# Patient Record
Sex: MALE | Race: WHITE
[De-identification: names, ages, dates, MRNs, and addresses within clinical notes are randomized per-mention and may not be internally consistent; named-entity substitution may affect disease eponyms.]

---

## 2017-07-06 ENCOUNTER — HOSPITAL ENCOUNTER (OUTPATIENT)
Dept: HOSPITAL 17 - HSDC | Age: 67
Discharge: HOME | End: 2017-07-06
Attending: SURGERY
Payer: COMMERCIAL

## 2017-07-06 VITALS
TEMPERATURE: 98.2 F | HEART RATE: 86 BPM | DIASTOLIC BLOOD PRESSURE: 87 MMHG | OXYGEN SATURATION: 96 % | SYSTOLIC BLOOD PRESSURE: 126 MMHG | RESPIRATION RATE: 18 BRPM

## 2017-07-06 VITALS — WEIGHT: 177.25 LBS | BODY MASS INDEX: 24.01 KG/M2 | HEIGHT: 72 IN

## 2017-07-06 DIAGNOSIS — M79.89: ICD-10-CM

## 2017-07-06 DIAGNOSIS — I10: ICD-10-CM

## 2017-07-06 DIAGNOSIS — I73.9: Primary | ICD-10-CM

## 2017-07-06 DIAGNOSIS — Z01.818: ICD-10-CM

## 2017-07-06 LAB
ANION GAP SERPL CALC-SCNC: 7 MEQ/L (ref 5–15)
APTT BLD: 27.9 SEC (ref 24.3–30.1)
BASOPHILS # BLD AUTO: 0.1 TH/MM3 (ref 0–0.2)
BASOPHILS NFR BLD: 0.9 % (ref 0–2)
BUN SERPL-MCNC: 14 MG/DL (ref 7–18)
CHLORIDE SERPL-SCNC: 106 MEQ/L (ref 98–107)
EOSINOPHIL # BLD: 0.1 TH/MM3 (ref 0–0.4)
EOSINOPHIL NFR BLD: 0.9 % (ref 0–4)
ERYTHROCYTE [DISTWIDTH] IN BLOOD BY AUTOMATED COUNT: 14.4 % (ref 11.6–17.2)
GFR SERPLBLD BASED ON 1.73 SQ M-ARVRAT: 74 ML/MIN (ref 89–?)
HCO3 BLD-SCNC: 25.8 MEQ/L (ref 21–32)
HCT VFR BLD CALC: 51.9 % (ref 39–51)
HEMO FLAGS: (no result)
INR PPP: 1 RATIO
LYMPHOCYTES # BLD AUTO: 1.4 TH/MM3 (ref 1–4.8)
LYMPHOCYTES NFR BLD AUTO: 20.9 % (ref 9–44)
MCH RBC QN AUTO: 32.3 PG (ref 27–34)
MCHC RBC AUTO-ENTMCNC: 34 % (ref 32–36)
MCV RBC AUTO: 95 FL (ref 80–100)
MONOCYTES NFR BLD: 8.4 % (ref 0–8)
NEUTROPHILS # BLD AUTO: 4.8 TH/MM3 (ref 1.8–7.7)
NEUTROPHILS NFR BLD AUTO: 68.9 % (ref 16–70)
PLATELET # BLD: 270 TH/MM3 (ref 150–450)
POTASSIUM SERPL-SCNC: 3.9 MEQ/L (ref 3.5–5.1)
PROTHROMBIN TIME: 11.5 SEC (ref 9.8–11.6)
RBC # BLD AUTO: 5.46 MIL/MM3 (ref 4.5–5.9)
SODIUM SERPL-SCNC: 139 MEQ/L (ref 136–145)
WBC # BLD AUTO: 6.9 TH/MM3 (ref 4–11)

## 2017-07-06 PROCEDURE — C1769 GUIDE WIRE: HCPCS

## 2017-07-06 PROCEDURE — 85025 COMPLETE CBC W/AUTO DIFF WBC: CPT

## 2017-07-06 PROCEDURE — 85002 BLEEDING TIME TEST: CPT

## 2017-07-06 PROCEDURE — 80048 BASIC METABOLIC PNL TOTAL CA: CPT

## 2017-07-06 PROCEDURE — G0269 OCCLUSIVE DEVICE IN VEIN ART: HCPCS

## 2017-07-06 PROCEDURE — 85610 PROTHROMBIN TIME: CPT

## 2017-07-06 PROCEDURE — 37226: CPT

## 2017-07-06 PROCEDURE — 85730 THROMBOPLASTIN TIME PARTIAL: CPT

## 2017-07-06 PROCEDURE — 75710 ARTERY X-RAYS ARM/LEG: CPT

## 2017-07-07 NOTE — MA
cc:

VIJAY LITTLE DO

****

 

 

DATE: 07/06/2017

 

PREOPERATIVE DIAGNOSIS

Lifestyle limiting claudication of right lower extremity

 

POSTOPERATIVE DIAGNOSIS

 Lifestyle limiting claudication of right lower extremity

 

PROCEDURE

1.  Selective right lower extremity arteriogram.

2.  Rechanneling of right superficial femoral artery.

3.  Balloon angioplasty and selective stenting of the right superficial femoral

extending to the popliteal artery above knee.

 

SURGEON

HAROON Little, 

 

FLUIDS

IV fluids  1 liter crystalloid

 

ESTIMATED BLOOD LOSS

Minimal

 

URINE OUTPUT

Not calculated.

 

COMPLICATIONS

None

 

DISPOSITION

To PACU

 

PROCEDURE IN DETAIL

The patient's bilateral groins were prepped and draped in sterile fashion.  I

got access to the left common femoral artery using duplex ultrasound with a 21

gauge needle.  I advanced an Omni flush catheter over a stiff angled Glidewire 
into the

right common femoral artery and I shot a selective right lower extremity

arteriogram.  My findings were that the right common femoral and profunda

femoral arteries were widely patent.  The right superficial femoral

disease with a chronic total occlusion in the mid SFA through the area of

Poli's canal and extending to the popliteal artery with multiple collaterals.

The right popliteal artery appeared to be patent.  The right anterior tibial

arteries and the peroneal arteries with the right main runoff to the right

lower extremity.  We did heparinize the patient to an ACT of greater than 100.

I used a stiff angled Glidewire, a grand slam wire and a quick catheter in

order to cross antegrade.  I was unable to get across the right SFA lesion so

the I went retrograde  under direct fluoroscopic guidance and entered the right

anterior tibial artery.  I advanced a stiff angled Glidewire and exchanged for

a 5-Luxembourger sheath and a quick cross catheter.  I was able to get in from  below

and then I snared my stiff angled Glidewire and brought it across the left

side. I then exchanged and worked from the antegrade direction.  I performed

balloon angioplasty with a 4 mm balloon of the right SFA  across to the right

popliteal artery.  It should be noted that subsequently I  used  multiple

Zilver PDX stents including two  120 x 6, a 100 x 6 and an 80 x 6 mm

drug-eluting stents.  It should be noted  that there were additional stents

needed as  I think for subintimal dissections in both directions.  At the end

of the procedure, there was brisk flow through the right  superficial femoral

artery and popliteal artery with two-vessel runoff through the anterior tibial

and peroneal arteries.  I then made sure that we gave approximately 40 of

protamine and p.o. Plavix at the end of the case. We did exchange for a

6-Luxembourger Angio-Seal in the left groin and then  applied pressure to the right

shin below the level of the knee after we removed the 5-Luxembourger sheath.  The

patient tolerated the procedure well and was taken to the  PACU at the end of

the case.

 

 

                              _________________________________

                              DO GATO Kendall/

D:  7/6/2017/5:36 PM

T:  7/7/2017/9:00 AM

Visit #:  [id number]

Job #:  [id number]

[email or web address]

## 2017-12-13 ENCOUNTER — HOSPITAL ENCOUNTER (EMERGENCY)
Dept: HOSPITAL 17 - PHED | Age: 67
Discharge: HOME | End: 2017-12-13
Payer: COMMERCIAL

## 2017-12-13 VITALS — BODY MASS INDEX: 22.87 KG/M2 | HEIGHT: 72 IN | WEIGHT: 168.87 LBS

## 2017-12-13 VITALS
SYSTOLIC BLOOD PRESSURE: 113 MMHG | RESPIRATION RATE: 16 BRPM | OXYGEN SATURATION: 97 % | TEMPERATURE: 97.5 F | HEART RATE: 74 BPM | DIASTOLIC BLOOD PRESSURE: 62 MMHG

## 2017-12-13 VITALS
RESPIRATION RATE: 18 BRPM | DIASTOLIC BLOOD PRESSURE: 86 MMHG | OXYGEN SATURATION: 97 % | SYSTOLIC BLOOD PRESSURE: 116 MMHG | HEART RATE: 86 BPM

## 2017-12-13 DIAGNOSIS — Z72.0: ICD-10-CM

## 2017-12-13 DIAGNOSIS — Z79.02: ICD-10-CM

## 2017-12-13 DIAGNOSIS — I10: ICD-10-CM

## 2017-12-13 DIAGNOSIS — R42: Primary | ICD-10-CM

## 2017-12-13 LAB
ANION GAP SERPL CALC-SCNC: 10 MEQ/L (ref 5–15)
BASOPHILS # BLD AUTO: 0.1 TH/MM3 (ref 0–0.2)
BASOPHILS NFR BLD: 0.6 % (ref 0–2)
BUN SERPL-MCNC: 16 MG/DL (ref 7–18)
CHLORIDE SERPL-SCNC: 103 MEQ/L (ref 98–107)
EOSINOPHIL # BLD: 0 TH/MM3 (ref 0–0.4)
EOSINOPHIL NFR BLD: 0.3 % (ref 0–4)
ERYTHROCYTE [DISTWIDTH] IN BLOOD BY AUTOMATED COUNT: 13.7 % (ref 11.6–17.2)
GFR SERPLBLD BASED ON 1.73 SQ M-ARVRAT: 85 ML/MIN (ref 89–?)
HCO3 BLD-SCNC: 25.5 MEQ/L (ref 21–32)
HCT VFR BLD CALC: 52 % (ref 39–51)
HEMO FLAGS: (no result)
LYMPHOCYTES # BLD AUTO: 0.5 TH/MM3 (ref 1–4.8)
LYMPHOCYTES NFR BLD AUTO: 3.6 % (ref 9–44)
MCH RBC QN AUTO: 31.5 PG (ref 27–34)
MCHC RBC AUTO-ENTMCNC: 34 % (ref 32–36)
MCV RBC AUTO: 92.7 FL (ref 80–100)
MONOCYTES NFR BLD: 3.5 % (ref 0–8)
NEUTROPHILS # BLD AUTO: 13.8 TH/MM3 (ref 1.8–7.7)
NEUTROPHILS NFR BLD AUTO: 92 % (ref 16–70)
PLATELET # BLD: 218 TH/MM3 (ref 150–450)
POTASSIUM SERPL-SCNC: 3.6 MEQ/L (ref 3.5–5.1)
RBC # BLD AUTO: 5.6 MIL/MM3 (ref 4.5–5.9)
SODIUM SERPL-SCNC: 138 MEQ/L (ref 136–145)
WBC # BLD AUTO: 14.9 TH/MM3 (ref 4–11)

## 2017-12-13 PROCEDURE — 85025 COMPLETE CBC W/AUTO DIFF WBC: CPT

## 2017-12-13 PROCEDURE — 80048 BASIC METABOLIC PNL TOTAL CA: CPT

## 2017-12-13 PROCEDURE — 93005 ELECTROCARDIOGRAM TRACING: CPT

## 2017-12-13 PROCEDURE — 96361 HYDRATE IV INFUSION ADD-ON: CPT

## 2017-12-13 PROCEDURE — 99285 EMERGENCY DEPT VISIT HI MDM: CPT

## 2017-12-13 PROCEDURE — 70450 CT HEAD/BRAIN W/O DYE: CPT

## 2017-12-13 PROCEDURE — 96374 THER/PROPH/DIAG INJ IV PUSH: CPT

## 2017-12-13 NOTE — RADRPT
EXAM DATE/TIME:  12/13/2017 13:12 

 

[facility] COMPARISON:     

No previous studies available for comparison.

 

 

INDICATIONS :     

Light headed and head pressure. 

                      

 

RADIATION DOSE:     

66.04 CTDIvol (mGy) 

 

 

 

MEDICAL HISTORY :     

Hypertension.  

 

SURGICAL HISTORY :      

None. 

 

ENCOUNTER:      

Initial

 

ACUITY:      

1 day

 

PAIN SCALE:      

2/10

 

LOCATION:        

cranial 

 

TECHNIQUE:     

Multiple contiguous axial images were obtained of the head.  Using automated exposure control and adj
ustment of the mA and/or kV according to patient size, radiation dose was kept as low as reasonably a
chievable to obtain optimal diagnostic quality images.   DICOM format image data is available electro
nically for review and comparison.  

 

FINDINGS:     

 

CEREBRUM:     

The ventricles are normal for age.  No evidence of midline shift, mass lesion, hemorrhage or acute in
farction.  No extra-axial fluid collections are seen.

 

POSTERIOR FOSSA:     

The cerebellum and brainstem are intact.  The 4th ventricle is midline.  The cerebellopontine angle i
s unremarkable.

 

EXTRACRANIAL:     

The visualized portion of the orbits is intact. Fluid in the right mastoid air cells.

 

SKULL:     

The calvaria is intact.  No evidence of skull fracture.

 

CONCLUSION:     

1. No acute intracranial abnormality.

2. Fluid in right mastoid air cells which can be seen with mastoiditis in the right clinical setting.


 

 

 

 Cecilio Womack MD on December 13, 2017 at 13:22           

Board Certified Radiologist.

 This report was verified electronically.

## 2017-12-13 NOTE — PD
HPI


Chief Complaint:  Dizziness


Time Seen by Provider:  12:23


Travel History


International Travel<30 days:  No


Contact w/Intl Traveler<30days:  No


Traveled to known affect area:  No





History of Present Illness


HPI


This 67-year-old male says he was sitting at his desk when he had a sudden 

onset of lightheadedness.  He then vomited several times.  He did not have 

vertigo.  He has not had any diarrhea.  He has a history of hypertension.  He 

has had a stent placed in the legs and is on Plavix.  He had some pressure in 

his forehead during the symptoms which has been fairly persistent.  He does not 

describe this headache says that he had a similar episode to this 2 weeks ago.  

He laid down and rested and felt better when he got up.  The patient has been 

[facility]  He apparently has a cyst around his right 

ear which has been eating into the bone.  There is been some talk of gluing 

this area.  The patient was due to have it done in November but backed out.  He 

had gone to ENT because he had a loss of hearing in his right ear.  He has not 

had any tinnitus





PFSH


Past Medical History


Hx Anticoagulant Therapy:  Yes


Cancer:  No


Cardiovascular Problems:  Yes (htn on meds)


Chest Pain:  No


Diabetes:  No


Diminished Hearing:  Yes


Endocrine:  No


Gastrointestinal Disorders:  Yes


Glaucoma:  No


Genitourinary:  No


Hepatitis:  No


Hiatal Hernia:  No


Hypertension:  Yes


Immune Disorder:  No


Musculoskeletal:  No


Neurologic:  No


Psychiatric:  No


Reproductive:  No


Respiratory:  No


Integumentary:  No


Immunizations Current:  No


Thyroid Disease:  No





Past Surgical History


Abdominal Surgery:  No


AICD:  No


Cardiac Surgery:  No


Ear Surgery:  No


Endocrine Surgery:  No


Eye Surgery:  No


Genitourinary Surgery:  No


Gynecologic Surgery:  No


Joint Replacement:  No


Oral Surgery:  No


Pacemaker:  No


Thoracic Surgery:  No


Other Surgery:  Yes (HERNIA REPAIR 20 YEARS AGO)





Social History


Alcohol Use:  Yes (occasional alcohol use, no alcohol the last 6 weeks)


Tobacco Use:  Yes (occasional cigars)


Substance Use:  No





Allergies-Medications


(Allergen,Severity, Reaction):  


Coded Allergies:  


     penicillin G (Unverified  Allergy, Severe, Anaphylaxis, 12/13/17)


Reported Meds & Prescriptions





Reported Meds & Active Scripts


Active


Reported


Plavix (Clopidogrel Bisulfate) 75 Mg Tab 75 Mg PO DAILY


Aspirin 81 Mg Chew 81 Mg PO DAILY


Microzide (Hydrochlorothiazide) 12.5 Mg Cap 12.5 Mg PO DAILY


Amlodipine-Benazepril 5-20 Mg Cap 1 Cap PO DAILY








Review of Systems


General / Constitutional:  No: Fever, Chills


Eyes:  No: Diploplia


HENT:  Positive: Headaches


Cardiovascular:  No: Chest Pain or Discomfort, Palpitations


Respiratory:  No: Cough, Shortness of Breath


Gastrointestinal:  Positive: Vomiting


Genitourinary:  No: Urgency, Frequency


Musculoskeletal:  No: Myalgias


Skin:  No Rash, No Itching


Neurologic:  Positive: Weakness, No: Dizziness, Syncope, Focal Abnormalities


Endocrine:  No: Heat Intolerance


Hematologic/Lymphatic:  No: Easy Bruising





Physical Exam


Narrative


GENERAL: Well-developed male


SKIN: Focused skin assessment warm/dry.


HEAD: Atraumatic. Normocephalic. 


EYES: Pupils equal and round. No scleral icterus. No injection or drainage. 


ENT: No nasal bleeding or discharge.  Mucous membranes pink and moist.


NECK: Trachea midline. No JVD. 


CARDIOVASCULAR: Regular rate and rhythm.  No murmur appreciated.


RESPIRATORY: No accessory muscle use. Clear to auscultation. Breath sounds 

equal bilaterally. 


GASTROINTESTINAL: Abdomen soft, non-tender, nondistended. Hepatic and splenic 

margins not palpable. 


MUSCULOSKELETAL: No obvious deformities. No clubbing.  No cyanosis.  No edema. 


NEUROLOGICAL: Awake and alert. No obvious cranial nerve deficits.  Motor 

grossly within normal limits. Normal speech.


PSYCHIATRIC: Appropriate mood and affect; insight and judgment normal.





Data


Data


Last Documented VS





Vital Signs








  Date Time  Temp Pulse Resp B/P (MAP) Pulse Ox O2 Delivery O2 Flow Rate FiO2


 


12/13/17 13:44  86 18 116/86 (96) 97 Room Air  


 


12/13/17 12:14 97.5       








Orders





 Orders


Electrocardiogram (12/13/17 12:31)


Complete Blood Count With Diff (12/13/17 12:31)


Basic Metabolic Panel (Bmp) (12/13/17 12:31)


Ct Brain W/O Iv Contrast(Rout) (12/13/17 12:31)


Sodium Chlor 0.9% 1000 Ml Inj (Ns 1000 M (12/13/17 12:45)


Ondansetron Inj (Zofran Inj) (12/13/17 12:45)





Labs





Laboratory Tests








Test


  12/13/17


12:40


 


White Blood Count 14.9 TH/MM3 


 


Red Blood Count 5.60 MIL/MM3 


 


Hemoglobin 17.6 GM/DL 


 


Hematocrit 52.0 % 


 


Mean Corpuscular Volume 92.7 FL 


 


Mean Corpuscular Hemoglobin 31.5 PG 


 


Mean Corpuscular Hemoglobin


Concent 34.0 % 


 


 


Red Cell Distribution Width 13.7 % 


 


Platelet Count 218 TH/MM3 


 


Mean Platelet Volume 7.0 FL 


 


Neutrophils (%) (Auto) 92.0 % 


 


Lymphocytes (%) (Auto) 3.6 % 


 


Monocytes (%) (Auto) 3.5 % 


 


Eosinophils (%) (Auto) 0.3 % 


 


Basophils (%) (Auto) 0.6 % 


 


Neutrophils # (Auto) 13.8 TH/MM3 


 


Lymphocytes # (Auto) 0.5 TH/MM3 


 


Monocytes # (Auto) 0.5 TH/MM3 


 


Eosinophils # (Auto) 0.0 TH/MM3 


 


Basophils # (Auto) 0.1 TH/MM3 


 


CBC Comment DIFF FINAL 


 


Differential Comment  


 


Blood Urea Nitrogen 16 MG/DL 


 


Creatinine 0.89 MG/DL 


 


Random Glucose 117 MG/DL 


 


Calcium Level 8.3 MG/DL 


 


Sodium Level 138 MEQ/L 


 


Potassium Level 3.6 MEQ/L 


 


Chloride Level 103 MEQ/L 


 


Carbon Dioxide Level 25.5 MEQ/L 


 


Anion Gap 10 MEQ/L 


 


Estimat Glomerular Filtration


Rate 85 ML/MIN 


 











MDM


Medical Decision Making


Medical Screen Exam Complete:  Yes


Emergency Medical Condition:  Yes


Medical Record Reviewed:  Yes


Differential Diagnosis


Differential includes vertigo, dysrhythmia, electrolyte imbalance.  Dehydration


Narrative Course


Lab work is unremarkable.  Patient has been given IV fluids and some Zofran and 

is feeling better.  He wonders if this may be related to this cyst she has on 

the year.  I'm not sure.  He does not describe vertigo.  I have recommended he 

follow up with the urinalysis [facility] throat doctor at [facility]





Diagnosis





 Primary Impression:  


 Lightheadedness


Disposition:  01 DISCHARGE HOME


Condition:  Stable











Robin Tavera MD Dec 13, 2017 12:34

## 2017-12-14 NOTE — EKG
Date Performed: 12/13/2017       Time Performed: 12:44:19

 

PTAGE:      67 years

 

EKG:      Sinus rhythm 

 

 NONSPECIFIC T-WAVE ABNORMALITY ABNORMAL ECG Compared to prior tracing no significant change 

 

 PREVIOUS TRACING            : 09/27/2016 09.57

 

DOCTOR:   Anne Marc  Interpretating Date/Time  12/14/2017 13:59:10

## 2018-03-15 ENCOUNTER — HOSPITAL ENCOUNTER (INPATIENT)
Dept: HOSPITAL 17 - PHED | Age: 68
LOS: 1 days | Discharge: HOME | DRG: 916 | End: 2018-03-16
Attending: INTERNAL MEDICINE | Admitting: INTERNAL MEDICINE
Payer: MEDICARE

## 2018-03-15 VITALS
SYSTOLIC BLOOD PRESSURE: 115 MMHG | OXYGEN SATURATION: 98 % | RESPIRATION RATE: 14 BRPM | HEART RATE: 90 BPM | TEMPERATURE: 98 F | DIASTOLIC BLOOD PRESSURE: 85 MMHG

## 2018-03-15 VITALS — HEART RATE: 79 BPM

## 2018-03-15 VITALS
SYSTOLIC BLOOD PRESSURE: 109 MMHG | RESPIRATION RATE: 18 BRPM | HEART RATE: 93 BPM | OXYGEN SATURATION: 98 % | DIASTOLIC BLOOD PRESSURE: 71 MMHG

## 2018-03-15 VITALS — SYSTOLIC BLOOD PRESSURE: 115 MMHG | OXYGEN SATURATION: 99 % | DIASTOLIC BLOOD PRESSURE: 75 MMHG

## 2018-03-15 VITALS
SYSTOLIC BLOOD PRESSURE: 112 MMHG | TEMPERATURE: 98.1 F | OXYGEN SATURATION: 95 % | RESPIRATION RATE: 19 BRPM | DIASTOLIC BLOOD PRESSURE: 75 MMHG | HEART RATE: 85 BPM

## 2018-03-15 VITALS — HEART RATE: 90 BPM

## 2018-03-15 VITALS
SYSTOLIC BLOOD PRESSURE: 110 MMHG | OXYGEN SATURATION: 98 % | HEART RATE: 79 BPM | TEMPERATURE: 98.1 F | DIASTOLIC BLOOD PRESSURE: 75 MMHG | RESPIRATION RATE: 18 BRPM

## 2018-03-15 VITALS — OXYGEN SATURATION: 97 %

## 2018-03-15 VITALS
OXYGEN SATURATION: 97 % | SYSTOLIC BLOOD PRESSURE: 118 MMHG | HEART RATE: 83 BPM | TEMPERATURE: 98.1 F | DIASTOLIC BLOOD PRESSURE: 76 MMHG | RESPIRATION RATE: 16 BRPM

## 2018-03-15 VITALS — HEART RATE: 97 BPM

## 2018-03-15 VITALS — HEART RATE: 81 BPM

## 2018-03-15 VITALS — OXYGEN SATURATION: 98 %

## 2018-03-15 DIAGNOSIS — T45.0X5A: ICD-10-CM

## 2018-03-15 DIAGNOSIS — T78.3XXA: Primary | ICD-10-CM

## 2018-03-15 DIAGNOSIS — I67.1: ICD-10-CM

## 2018-03-15 DIAGNOSIS — Z88.0: ICD-10-CM

## 2018-03-15 DIAGNOSIS — I10: ICD-10-CM

## 2018-03-15 DIAGNOSIS — Z72.0: ICD-10-CM

## 2018-03-15 DIAGNOSIS — H91.90: ICD-10-CM

## 2018-03-15 DIAGNOSIS — R42: ICD-10-CM

## 2018-03-15 LAB
ALBUMIN SERPL-MCNC: 2.7 GM/DL (ref 3.4–5)
ALP SERPL-CCNC: 57 U/L (ref 45–117)
ALT SERPL-CCNC: 12 U/L (ref 12–78)
AST SERPL-CCNC: 9 U/L (ref 15–37)
BILIRUB SERPL-MCNC: 0.6 MG/DL (ref 0.2–1)
BUN SERPL-MCNC: 16 MG/DL (ref 7–18)
CALCIUM SERPL-MCNC: 7.8 MG/DL (ref 8.5–10.1)
CHLORIDE SERPL-SCNC: 109 MEQ/L (ref 98–107)
CREAT SERPL-MCNC: 0.8 MG/DL (ref 0.6–1.3)
ERYTHROCYTE [DISTWIDTH] IN BLOOD BY AUTOMATED COUNT: 13.6 % (ref 11.6–17.2)
GFR SERPLBLD BASED ON 1.73 SQ M-ARVRAT: 96 ML/MIN (ref 89–?)
GLUCOSE SERPL-MCNC: 133 MG/DL (ref 74–106)
HCO3 BLD-SCNC: 25.7 MEQ/L (ref 21–32)
HCT VFR BLD CALC: 45.4 % (ref 39–51)
HGB BLD-MCNC: 15.7 GM/DL (ref 13–17)
MCH RBC QN AUTO: 32.4 PG (ref 27–34)
MCHC RBC AUTO-ENTMCNC: 34.6 % (ref 32–36)
MCV RBC AUTO: 93.7 FL (ref 80–100)
PLATELET # BLD: 307 TH/MM3 (ref 150–450)
PMV BLD AUTO: 7 FL (ref 7–11)
PROT SERPL-MCNC: 6.4 GM/DL (ref 6.4–8.2)
RBC # BLD AUTO: 4.85 MIL/MM3 (ref 4.5–5.9)
SODIUM SERPL-SCNC: 141 MEQ/L (ref 136–145)
WBC # BLD AUTO: 7.5 TH/MM3 (ref 4–11)

## 2018-03-15 PROCEDURE — 80053 COMPREHEN METABOLIC PANEL: CPT

## 2018-03-15 PROCEDURE — 71045 X-RAY EXAM CHEST 1 VIEW: CPT

## 2018-03-15 PROCEDURE — 96375 TX/PRO/DX INJ NEW DRUG ADDON: CPT

## 2018-03-15 PROCEDURE — 85025 COMPLETE CBC W/AUTO DIFF WBC: CPT

## 2018-03-15 PROCEDURE — 70491 CT SOFT TISSUE NECK W/DYE: CPT

## 2018-03-15 PROCEDURE — 87641 MR-STAPH DNA AMP PROBE: CPT

## 2018-03-15 PROCEDURE — 85027 COMPLETE CBC AUTOMATED: CPT

## 2018-03-15 PROCEDURE — 96372 THER/PROPH/DIAG INJ SC/IM: CPT

## 2018-03-15 PROCEDURE — 96374 THER/PROPH/DIAG INJ IV PUSH: CPT

## 2018-03-15 RX ADMIN — Medication SCH ML: at 21:00

## 2018-03-15 RX ADMIN — SODIUM BICARBONATE SCH MLS/HR: 84 INJECTION, SOLUTION INTRAVENOUS at 15:45

## 2018-03-15 RX ADMIN — DEXAMETHASONE SODIUM PHOSPHATE SCH MG: 4 INJECTION, SOLUTION INTRAMUSCULAR; INTRAVENOUS at 18:30

## 2018-03-15 RX ADMIN — IPRATROPIUM BROMIDE AND ALBUTEROL SULFATE SCH AMPULE: .5; 3 SOLUTION RESPIRATORY (INHALATION) at 16:00

## 2018-03-15 RX ADMIN — IPRATROPIUM BROMIDE AND ALBUTEROL SULFATE SCH AMPULE: .5; 3 SOLUTION RESPIRATORY (INHALATION) at 22:23

## 2018-03-15 RX ADMIN — DEXAMETHASONE SODIUM PHOSPHATE SCH MG: 4 INJECTION, SOLUTION INTRAMUSCULAR; INTRAVENOUS at 11:09

## 2018-03-15 RX ADMIN — IPRATROPIUM BROMIDE AND ALBUTEROL SULFATE SCH AMPULE: .5; 3 SOLUTION RESPIRATORY (INHALATION) at 10:00

## 2018-03-15 RX ADMIN — FAMOTIDINE SCH MG: 10 INJECTION, SOLUTION INTRAVENOUS at 21:43

## 2018-03-15 NOTE — PD
HPI


Chief Complaint:  Tongue swelling


Time Seen by Provider:  06:24


Travel History


International Travel<30 days:  No


Contact w/Intl Traveler<30days:  No


Traveled to known affect area:  No





History of Present Illness


HPI


Patient states unknown what may have triggered it but patient developed a 

swollen tongue, and difficulty speaking because of how swollen the tongue was.  

Patient also is denies any shortness of breath currently.  Denies any wheezing 

at this current time as well.  No alleviating or aggravating factors.  Patient 

denies any associated factors such as rash, fever, neck stiffness, headache, 

chest pain, abdominal pain, back pain.








Allergy to penicillin


Patient has past medical history significant for hypertension, ulcerated 

esophagus, hernia repair, stents on lower legs,





PFSH


Past Medical History


Hx Anticoagulant Therapy:  Yes


Cancer:  No


Cardiovascular Problems:  Yes (htn on meds)


Chest Pain:  No


Diabetes:  No


Diminished Hearing:  Yes


Endocrine:  No


Gastrointestinal Disorders:  Yes (ULCERATED ESOPHAGUS)


Glaucoma:  No


Genitourinary:  No


Hepatitis:  No


Hiatal Hernia:  No


Hypertension:  Yes


Immune Disorder:  No


Musculoskeletal:  No


Neurologic:  No


Psychiatric:  No


Reproductive:  No


Respiratory:  No


Integumentary:  No


Immunizations Current:  No


Thyroid Disease:  No





Past Surgical History


Abdominal Surgery:  No


AICD:  No


Cardiac Surgery:  No


Ear Surgery:  No


Endocrine Surgery:  No


Eye Surgery:  No


Genitourinary Surgery:  No


Gynecologic Surgery:  No


Joint Replacement:  No


Oral Surgery:  No


Pacemaker:  No


Thoracic Surgery:  No


Other Surgery:  Yes (HERNIA REPAIR STENTS LOWER LEGS)





Social History


Alcohol Use:  Yes (occasional alcohol use, )


Tobacco Use:  Yes (occasional cigars)


Substance Use:  No





Allergies-Medications


(Allergen,Severity, Reaction):  


Coded Allergies:  


     penicillin G (Verified  Allergy, Severe, Anaphylaxis, 3/15/18)


Reported Meds & Prescriptions





Reported Meds & Active Scripts


Active


Reported


Meclizine (Meclizine HCl) 12.5 Mg Tab 12.5 Mg PO TID PRN


Aspirin 81 Mg Chew 81 Mg PO DAILY


Microzide (Hydrochlorothiazide) 12.5 Mg Cap 12.5 Mg PO DAILY


Amlodipine-Benazepril 5-20 Mg Cap 1 Cap PO DAILY








Review of Systems


General / Constitutional:  No: Fever


Eyes:  No: Visual changes


HENT:  Positive: Other (Tongue swelling)


Cardiovascular:  No: Chest Pain or Discomfort


Respiratory:  No: Shortness of Breath


Gastrointestinal:  No: Abdominal Pain


Genitourinary:  No: Dysuria


Musculoskeletal:  No: Pain


Skin:  No Rash


Neurologic:  No: Weakness


Psychiatric:  No: Depression


Endocrine:  No: Polydipsia


Hematologic/Lymphatic:  No: Easy Bruising





Physical Exam


Narrative


GENERAL: 


SKIN: Warm and dry.


HEAD: Atraumatic. Normocephalic. 


EYES: Pupils equal and round. No scleral icterus. No injection or drainage. 


ENT: No nasal bleeding or discharge.  Mucous membranes pink and moist.  Tongue 

is diffusely erythematous and enlarged however the patient is still able to 

open mouth and the base of the uvula is partially seen this is the equivalent 

of a Mallampati 3, no uvular edema noted, no lip edema,


NECK: Trachea midline. No JVD.  No stridor


CARDIOVASCULAR: Regular rate and rhythm.  


RESPIRATORY: No accessory muscle use. Clear to auscultation. Breath sounds 

equal bilaterally.  No wheezing


GASTROINTESTINAL: Abdomen soft, non-tender, nondistended. Hepatic and splenic 

margins not palpable. 


MUSCULOSKELETAL: Extremities without clubbing, cyanosis, or edema. No obvious 

deformities. 


NEUROLOGICAL: Awake and alert. No obvious cranial nerve deficits.  Motor 

grossly within normal limits. Five out of 5 muscle strength in the arms and 

legs.  Normal speech.


PSYCHIATRIC: Appropriate mood and affect; insight and judgment normal.





Data


Data


Last Documented VS





Vital Signs








  Date Time  Temp Pulse Resp B/P (MAP) Pulse Ox O2 Delivery O2 Flow Rate FiO2


 


3/15/18 07:00  93 18 109/71 (84) 98   


 


3/15/18 06:51      Room Air  








Orders





 Orders


Ecg Monitoring (3/15/18 06:24)


Iv Access Insert/Monitor (3/15/18 06:24)


Oximetry (3/15/18 06:24)


Diphenhydramine Inj (Benadryl Inj) (3/15/18 06:30)


Methylprednisolone So Succ Inj (Solumedr (3/15/18 06:30)


Famotidine Inj (Pepcid Inj) (3/15/18 06:30)


Sodium Chloride 0.9% Flush (Ns Flush) (3/15/18 06:30)


Epinephrine (1:1000) Inj (Adrenalin (1:1 (3/15/18 06:30)








MDM


Medical Decision Making


Medical Screen Exam Complete:  Yes


Emergency Medical Condition:  Yes


Medical Record Reviewed:  Yes


Differential Diagnosis


Angioedema versus anaphylaxis versus allergic reaction


Narrative Course


The patient was seen immediately and started on IV, and orders for Solu-Medrol, 

Pepcid 20 mg, Benadryl, as well as EpiPen Dereck.  The decision to use EpiPen 

Jr rather than the full adult doses because the patient has a history of 

hypertension.  Patient is signed out to incoming physician for observation and 

reevaluation


Critical Care Narrative


CRITICAL CARE NOTE: With evaluation of the patient, labs, EKG, receipt of 

radiologic studies, administration of medications, reevaluation the patient and 

discussion of the patient with the admitting physicians, the total critical 

care time was [30] minutes. Time to perform other separately billable 

procedures was not included in the critical care time.





Diagnosis





 Primary Impression:  


 Angioedema


Patient Instructions:  Angioedema (ED), General Instructions


Scripts


Famotidine (Pepcid) 40 Mg Tab


40 MG PO DAILY, #7 TAB 0 Refills


   Prov: Jaya Yousif MD         3/15/18 


Loratadine (Claritin) 10 Mg Cap


10 MG PO DAILY for Allergy Management, #7 CAP 0 Refills


   Prov: Jaya Yousif MD         3/15/18 


Methylprednisolone Dosepak (Medrol Dosepak) 4 Mg Dspk


4 MG PO AS DIRECTED, #1 DSPK 0 Refills


   Per Pharmacist direction


   Prov: Jaya Yousif MD         3/15/18


Disposition:  01 DISCHARGE HOME


Condition:  Stable











Jaya Yousif MD Mar 15, 2018 06:28

## 2018-03-15 NOTE — PD
Data


Data


Last Documented VS





Vital Signs








  Date Time  Temp Pulse Resp B/P (MAP) Pulse Ox O2 Delivery O2 Flow Rate FiO2


 


3/15/18 08:28    115/75 (88) 99 Nasal Cannula 2.00 


 


3/15/18 07:00  93 18     








Orders





 Orders


Ecg Monitoring (3/15/18 06:24)


Iv Access Insert/Monitor (3/15/18 06:24)


Oximetry (3/15/18 06:24)


Diphenhydramine Inj (Benadryl Inj) (3/15/18 06:30)


Methylprednisolone So Succ Inj (Solumedr (3/15/18 06:30)


Famotidine Inj (Pepcid Inj) (3/15/18 06:30)


Sodium Chloride 0.9% Flush (Ns Flush) (3/15/18 06:30)


Epinephrine (1:1000) Inj (Adrenalin (1:1 (3/15/18 06:30)


Admit Order (Ed Use Only) (3/15/18 08:30)








MDM


Supervised Visit with GI:  No


Narrative Course


This case was checked out to me at 7 AM.





He came in with angioedema and was given some medications.





I rechecked him an hour later and he is no better.  He is gurgling when he 

talks.  He has a swollen thick tongue.  There is a bit of submental edema as 

well.


This is very concerning.  I considered intubating him but he would be a very 

challenging intubation





Therefore spoke with the anesthesiologist Dr. Kaye who came down to the 

room and examined the patient.  He did not feel the patient required emergent 

intubation.  He felt he would be stable for transfer to the ICU at the [facility] 

hospital.  Both anesthesiologist and myself spoke with intensivist Dr. Mcrae 

and he accepts the case and we have emergent transfer process.  Intensivist 

requested I speak with ENT physician about the case and I have spoken with Dr. Rivas.





I rechecked the patient multiple times.  He is a complex and challenging case.


Medications administered did not help at all





Aggregate critical care time was 40 minutes. Time to perform other separately 

billable procedures was not included in the critical care time. My time did not 

include minutes spent treating any other patients simultaneously or on 

activities that did not directly contribute to the patient's treatment.  





The services I provided to this patient were to treat and/or prevent clinically 

significant deterioration that could result in: Loss of airway, cardiopulmonary 

arrest, hypoxemic brain injury





I provided critical care services requiring my management, as noted below:


Chart data review, documentation time, medication orders and management, vital 

sign assessments/reviewing monitor data, ordering and reviewing lab tests, 

ordering and interpreting/reviewing x-rays and diagnostic studies, care of the 

patient and discussion of the patient with the admitting physicians.





I've ordered some baseline labs as I don't think any have been done.


Diagnosis





 Primary Impression:  


 Angioedema





Admitting Information


Admitting Physician Requests:  Admit


Scripts


Famotidine (Pepcid) 40 Mg Tab


40 MG PO DAILY, #7 TAB 0 Refills


   Prov: Jaya Yousif MD         3/15/18 


Loratadine (Claritin) 10 Mg Cap


10 MG PO DAILY for Allergy Management, #7 CAP 0 Refills


   Prov: Jaya Yousif MD         3/15/18 


Methylprednisolone Dosepak (Medrol Dosepak) 4 Mg Dspk


4 MG PO AS DIRECTED, #1 DSPK 0 Refills


   Per Pharmacist direction


   Prov: Jaya Yousif MD         3/15/18











Gomez Crawford MD Mar 15, 2018 08:48

## 2018-03-15 NOTE — RADRPT
EXAM DATE/TIME:  03/15/2018 16:16 

 

[facility] COMPARISON:     

No previous studies available for comparison.

 

 

INDICATIONS :     

Patient complains of neck swelling.

                      

 

IV CONTRAST:     

62 cc Omnipaque 350 (iohexol) IV 

                      

 

RADIATION DOSE:     

17.67 CTDIvol (mGy) 

 

 

MEDICAL HISTORY :     

Hypertension.  

 

SURGICAL HISTORY :      

None. 

 

ENCOUNTER:      

Initial

 

ACUITY:      

1 day

 

PAIN SCALE:      

4/10

 

LOCATION:        

neck 

 

TECHNIQUE:     

Volumetric scanning of the neck was performed.  Using automated exposure control and adjustment of th
e mA and/or kV according to patient size, radiation dose was kept as low as reasonably achievable to 
obtain optimal diagnostic quality images.   DICOM format image data is available electronically for r
eview and comparison.  

 

FINDINGS:     

On the first several images through the base of the head there appears to be a aneurysm in the distal
 right middle cerebral artery measuring up to 7 mm in diameter.

 

There is some stranding of the anterior subcutaneous fat probably representing some inflammatory or e
dematous changes. No loculated fluid to suggest abscess.

 

No discrete thyroid masses. No airway obstructing lesions or foreign bodies. No acute bony abnormalit
ies.

 

CONCLUSION:     

1. 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 This cou
ld be further evaluated with MRA brain.

2. Subcutaneous fat stranding anteriorly and extending into the deeper soft tissues. This could repre
sent some edema or inflammatory changes of uncertain etiology. No pathologically enlarged lymph nodes
 identified.

3. Emphysema at the lung apices.

 

 

 

 James Navas MD on March 15, 2018 at 16:44           

Board Certified Radiologist.

 This report was verified electronically.

## 2018-03-15 NOTE — RADRPT
EXAM DATE/TIME:  03/15/2018 10:26 

 

[facility] COMPARISON:     

No previous studies available for comparison.

 

                     

INDICATIONS :     

Short of breath due to swollen tongue. Neck inflammation.

                     

 

MEDICAL HISTORY :     

Hypertension.          

 

SURGICAL HISTORY :     

None.   

 

ENCOUNTER:     

Initial                                        

 

ACUITY:     

1 day      

 

PAIN SCORE:     

0/10

 

LOCATION:     

Bilateral chest 

 

FINDINGS:     

A single view of the chest demonstrates the lungs to be symmetrically aerated without evidence of mas
s, infiltrate or effusion.  The cardiomediastinal contours are unremarkable.  Osseous structures are 
intact.

 

CONCLUSION:     No acute disease.  

 

 

 

 Manan Dave MD on March 15, 2018 at 10:52           

Board Certified Radiologist.

 This report was verified electronically.

## 2018-03-15 NOTE — HHI.HP
HPI


Service


Critical Care Medicine


Primary Care Physician


Keenan Wilkins MD


Admission Diagnosis





severe angioedema


Diagnosis:  


Chief Complaint:  


Tongue swelling, vertigo, nausea/ vomiting


Travel History


International Travel<30 Days:  No


Contact w/Intl Traveler <30 Da:  No


Traveled to Known Affected Are:  No


History of Present Illness





67 Year-old male who developed nausea vomiting with dizziness last evening and 

took meclizine.  This morning around 2:30 AM he noticed tongue swelling with 

difficulty speech hence was brought to the ER by his wife.  She did given 

Benadryl tablets at home.  On evaluation in the ER at [facility] he was noted 

to have significant tongue swelling and was diagnosed to have angioedema.  He 

received IV Solu-Medrol, Benadryl as well as famotidine and epinephrine.  He 

was evaluated by anesthesiologist at [facility] who felt that patient's tongue 

swelling was going down and he was able to speak significantly better and was 

seen for transport to the main hospital without intubation.  Patient was 

transferred to [facility] where I evaluated him following his arrival.  At that time 

he was laying in the bed not in any acute distress.  He did have some tongue 

swelling at the time with some submental edema however was able to speak 

significantly better and felt that his tongue swelling was going down since his 

arrival to the ER earlier.  He does take amlodipine/benazepril for hypertension 

for years and the benazepril may be what caused angioedema.  He also stated 

that he has been having problems with vertigo with nausea vomiting and 

dizziness since 4 months.  He has had chronic problem with fluid in the right 

ear for which he previously has been evaluated by specialists.  He has not been 

seen for his vertigo in the last 4 months by any ENT physician and has just 

been using meclizine off and on as needed.  Patient denies any fevers, chills.  

He recently was treated for respiratory tract infection with Z-Roderick and Medrol 

Dosepak which she finished a few days ago.  He is allergic to penicillin which 

causes tongue swelling however denies having received any penicillin-containing 

antibiotic.


Patient has past medical history significant for hypertension, ulcerated 

esophagus, hernia repair, stents on lower legs,





 History 


PFSH


Past Medical History


Hx Anticoagulant Therapy:  Yes


Cancer:  No


Cardiovascular Problems:  Yes (htn on meds)


Chest Pain:  No


Diabetes:  No


Diminished Hearing:  Yes


Endocrine:  No


Gastrointestinal Disorders:  Yes (ULCERATED ESOPHAGUS)


Glaucoma:  No


Genitourinary:  No


Hepatitis:  No


Hiatal Hernia:  No


Hypertension:  Yes


Immune Disorder:  No


Musculoskeletal:  No


Neurologic:  No


Psychiatric:  No


Reproductive:  No


Respiratory:  No


Integumentary:  No


Immunizations Current:  No


Thyroid Disease:  No





Past Surgical History


Abdominal Surgery:  No


AICD:  No


Cardiac Surgery:  No


Ear Surgery:  No


Endocrine Surgery:  No


Eye Surgery:  No


Genitourinary Surgery:  No


Gynecologic Surgery:  No


Joint Replacement:  No


Oral Surgery:  No


Pacemaker:  No


Thoracic Surgery:  No


Other Surgery:  Yes (HERNIA REPAIR STENTS LOWER LEGS)





Social History


Alcohol Use:  Yes (occasional alcohol use, )


Tobacco Use:  Yes (occasional cigars)


Substance Use:  No





 Allergies-Medications 


Allergies-Medications


(Allergen,Severity, Reaction):  


Coded Allergies:  


     penicillin G (Verified  Allergy, Severe, Anaphylaxis, 3/15/18)


Reported Meds & Prescriptions





Reported Meds & Active Scripts


Active


Reported


Meclizine (Meclizine HCl) 12.5 Mg Tab 12.5 Mg PO TID PRN


Aspirin 81 Mg Chew 81 Mg PO DAILY


Microzide (Hydrochlorothiazide) 12.5 Mg Cap 12.5 Mg PO DAILY


Amlodipine-Benazepril 5-20 Mg Cap 1 Cap PO DAILY








 ROS 


Review of Systems


General / Constitutional:  No: Fever


Eyes:  No: Visual changes


HENT:  Positive: Other (Tongue swelling)


Cardiovascular:  No: Chest Pain or Discomfort


Respiratory:  No: Shortness of Breath


Gastrointestinal:  No: Abdominal Pain


Genitourinary:  No: Dysuria


Musculoskeletal:  No: Pain


Skin:  No Rash


Neurologic:  No: Weakness


Psychiatric:  No: Depression


Endocrine:  No: Polydipsia


Hematologic/Lymphatic:  No: Easy Bruising





Physical Exam


Vital Signs





Vital Signs








  Date Time  Temp Pulse Resp B/P (MAP) Pulse Ox O2 Delivery O2 Flow Rate FiO2


 


3/15/18 08:28    115/75 (88) 99 Nasal Cannula 2.00 


 


3/15/18 08:26     98  2.00 


 


3/15/18 08:26     98 Nasal Cannula 2.00 


 


3/15/18 07:00  93 18 109/71 (84) 98   


 


3/15/18 06:51     97 Room Air  


 


3/15/18 06:45     97 Room Air  


 


3/15/18 06:32  86  113/84    








Physical Exam


Narrative


GENERAL: 


SKIN: Warm and dry.


HEAD: Atraumatic. Normocephalic. 


EYES: Pupils equal and round. No scleral icterus. No injection or drainage. 


ENT: No nasal bleeding or discharge.  Mucous membranes pink and moist.  Tongue 

is diffusely erythematous and enlarged however the patient is still able to 

open mouth and the base of the uvula is partially seen this is the equivalent 

of a Mallampati 3, no uvular edema noted, no lip edema,


NECK: Trachea midline. No JVD.  No stridor. minimal submandibular swelling in 

midline.


CARDIOVASCULAR: Regular rate and rhythm.  


RESPIRATORY: No accessory muscle use. Clear to auscultation. Breath sounds 

equal bilaterally.  No wheezing


GASTROINTESTINAL: Abdomen soft, non-tender, nondistended. Hepatic and splenic 

margins not palpable. 


MUSCULOSKELETAL: Extremities without clubbing, cyanosis, or edema. No obvious 

deformities. 


NEUROLOGICAL: Awake and alert. No obvious cranial nerve deficits.  Motor 

grossly within normal limits. Five out of 5 muscle strength in the arms and 

legs. speech slightly muffled.


PSYCHIATRIC: Appropriate mood and affect; insight and judgment normal.





Caprini VTE Risk Assessment


Caprini VTE Risk Assessment:  Mod/High Risk (score >= 2)


Caprini Risk Assessment Model











 Point Value = 1          Point Value = 2  Point Value = 3  Point Value = 5


 


Age 41-60


Minor surgery


BMI > 25 kg/m2


Swollen legs


Varicose veins


Pregnancy or postpartum


History of unexplained or recurrent


   spontaneous 


Oral contraceptives or hormone


   replacement


Sepsis (< 1 month)


Serious lung disease, including


   pneumonia (< 1 month)


Abnormal pulmonary function


Acute myocardial infarction


Congestive heart failure (< 1 month)


History of inflammatory bowel disease


Medical patient at bed rest Age 61-74


Arthroscopic surgery


Major open surgery (> 45 min)


Laparoscopic surgery (> 45 min)


Malignancy


Confined to bed (> 72 hours)


Immobilizing plaster cast


Central venous access Age >= 75


History of VTE


Family history of VTE


Factor V Leiden


Prothrombin [id number]


Lupus anticoagulant


Anticardiolipin antibodies


Elevated serum homocysteine


Heparin-induced thrombocytopenia


Other congenital or acquired


   thrombophilia Stroke (< 1 month)


Elective arthroplasty


Hip, pelvis, or leg fracture


Acute spinal cord injury (< 1 month)








Prophylaxis Regimen











   Total Risk


Factor Score Risk Level Prophylaxis Regimen


 


0-1      Low Early ambulation


 


2 Moderate Order ONE of the following:


*Sequential Compression Device (SCD)


*Heparin 5000 units SQ BID


 


3-4 Higher Order ONE of the following medications:


*Heparin 5000 units SQ TID


*Enoxaparin/Lovenox 40 mg SQ daily (WT < 150 kg, CrCl > 30 mL/min)


*Enoxaparin/Lovenox 30 mg SQ daily (WT < 150 kg, CrCl > 10-29 mL/min)


*Enoxaparin/Lovenox 30 mg SQ BID (WT < 150 kg, CrCl > 30 mL/min)


AND/OR


*Sequential Compression Device (SCD)


 


5 or more Highest Order ONE of the following medications:


*Heparin 5000 units SQ TID (Preferred with Epidurals)


*Enoxaparin/Lovenox 40 mg SQ daily (WT < 150 kg, CrCl > 30 mL/min)


*Enoxaparin/Lovenox 30 mg SQ daily (WT < 150 kg, CrCl > 10-29 mL/min)


*Enoxaparin/Lovenox 30 mg SQ BID (WT < 150 kg, CrCl > 30 mL/min)


AND


*Sequential Compression Device (SCD)











Assessment and Plan


Assessment and Plan


67-year-old male with:


Angioedema


Hypertension





Plan:


Admit to ICU


- IV Decadron, Benadryl, Pepcid.  Bronchodilators every 6 hourly and when 

necessary.


- ENT consult with Dr. Bauer who was already contacted by ER physician at [facility] prior to patient transfer and will evaluate patient for angioedema as 

well as for his vertigo.


- We will have airway cart and glide scope available at bedside in case patient 

needs intubation for airway protection if angioedema worsens.


- Labetalol when necessary for hypertension.


- I informed patient has wife that I felt that patient's angioedema was 

possibly related to ACE inhibitor (benazepril) and that he should stay off that 

medication.


- SCDs/Lovenox for DVT prophylaxis





Condition critical


Time spent on critical care excluding procedures 50 minutes











South Mcrae MD Mar 15, 2018 11:15

## 2018-03-16 VITALS
DIASTOLIC BLOOD PRESSURE: 70 MMHG | RESPIRATION RATE: 20 BRPM | SYSTOLIC BLOOD PRESSURE: 108 MMHG | TEMPERATURE: 98.2 F | OXYGEN SATURATION: 97 % | HEART RATE: 91 BPM

## 2018-03-16 VITALS
DIASTOLIC BLOOD PRESSURE: 55 MMHG | HEART RATE: 65 BPM | RESPIRATION RATE: 15 BRPM | SYSTOLIC BLOOD PRESSURE: 90 MMHG | OXYGEN SATURATION: 96 %

## 2018-03-16 VITALS
TEMPERATURE: 97.5 F | HEART RATE: 65 BPM | RESPIRATION RATE: 15 BRPM | SYSTOLIC BLOOD PRESSURE: 115 MMHG | OXYGEN SATURATION: 96 % | DIASTOLIC BLOOD PRESSURE: 77 MMHG

## 2018-03-16 VITALS — HEART RATE: 65 BPM

## 2018-03-16 VITALS — HEART RATE: 66 BPM

## 2018-03-16 VITALS — HEART RATE: 86 BPM

## 2018-03-16 LAB
ALBUMIN SERPL-MCNC: 2.5 GM/DL (ref 3.4–5)
ALP SERPL-CCNC: 54 U/L (ref 45–117)
ALT SERPL-CCNC: 14 U/L (ref 12–78)
AST SERPL-CCNC: 11 U/L (ref 15–37)
BASOPHILS # BLD AUTO: 0 TH/MM3 (ref 0–0.2)
BASOPHILS NFR BLD: 0.3 % (ref 0–2)
BILIRUB SERPL-MCNC: 0.3 MG/DL (ref 0.2–1)
BUN SERPL-MCNC: 17 MG/DL (ref 7–18)
CALCIUM SERPL-MCNC: 8 MG/DL (ref 8.5–10.1)
CHLORIDE SERPL-SCNC: 108 MEQ/L (ref 98–107)
CREAT SERPL-MCNC: 0.94 MG/DL (ref 0.6–1.3)
EOSINOPHIL # BLD: 0 TH/MM3 (ref 0–0.4)
EOSINOPHIL NFR BLD: 0 % (ref 0–4)
ERYTHROCYTE [DISTWIDTH] IN BLOOD BY AUTOMATED COUNT: 13.6 % (ref 11.6–17.2)
GFR SERPLBLD BASED ON 1.73 SQ M-ARVRAT: 80 ML/MIN (ref 89–?)
GLUCOSE SERPL-MCNC: 169 MG/DL (ref 74–106)
HCO3 BLD-SCNC: 25.2 MEQ/L (ref 21–32)
HCT VFR BLD CALC: 42.9 % (ref 39–51)
HGB BLD-MCNC: 14.7 GM/DL (ref 13–17)
LYMPHOCYTES # BLD AUTO: 0.5 TH/MM3 (ref 1–4.8)
LYMPHOCYTES NFR BLD AUTO: 5 % (ref 9–44)
MCH RBC QN AUTO: 32 PG (ref 27–34)
MCHC RBC AUTO-ENTMCNC: 34.3 % (ref 32–36)
MCV RBC AUTO: 93.5 FL (ref 80–100)
MONOCYTE #: 0.3 TH/MM3 (ref 0–0.9)
MONOCYTES NFR BLD: 3.4 % (ref 0–8)
NEUTROPHILS # BLD AUTO: 8.7 TH/MM3 (ref 1.8–7.7)
NEUTROPHILS NFR BLD AUTO: 91.3 % (ref 16–70)
PLATELET # BLD: 304 TH/MM3 (ref 150–450)
PMV BLD AUTO: 6.9 FL (ref 7–11)
PROT SERPL-MCNC: 6 GM/DL (ref 6.4–8.2)
RBC # BLD AUTO: 4.59 MIL/MM3 (ref 4.5–5.9)
SODIUM SERPL-SCNC: 141 MEQ/L (ref 136–145)
WBC # BLD AUTO: 9.5 TH/MM3 (ref 4–11)

## 2018-03-16 RX ADMIN — IPRATROPIUM BROMIDE AND ALBUTEROL SULFATE SCH AMPULE: .5; 3 SOLUTION RESPIRATORY (INHALATION) at 10:00

## 2018-03-16 RX ADMIN — IPRATROPIUM BROMIDE AND ALBUTEROL SULFATE SCH AMPULE: .5; 3 SOLUTION RESPIRATORY (INHALATION) at 04:00

## 2018-03-16 RX ADMIN — Medication SCH ML: at 09:00

## 2018-03-16 RX ADMIN — FAMOTIDINE SCH MG: 10 INJECTION, SOLUTION INTRAVENOUS at 07:40

## 2018-03-16 RX ADMIN — DEXAMETHASONE SODIUM PHOSPHATE SCH MG: 4 INJECTION, SOLUTION INTRAMUSCULAR; INTRAVENOUS at 04:04

## 2018-03-16 RX ADMIN — SODIUM BICARBONATE SCH MLS/HR: 84 INJECTION, SOLUTION INTRAVENOUS at 00:52

## 2018-03-16 NOTE — RADRPT
EXAM DATE/TIME:  03/16/2018 03:01 

 

[facility] COMPARISON:     

CHEST SINGLE AP, March 15, 2018, 10:26.

 

                     

INDICATIONS :     

Evaluate for infiltrates.

                     

 

MEDICAL HISTORY :            

Hypertension.   

 

SURGICAL HISTORY :     

None.   

 

ENCOUNTER:     

Subsequent                                        

 

ACUITY:     

2 days      

 

PAIN SCORE:     

0/10

 

LOCATION:     

Bilateral chest 

 

FINDINGS:     

A single view of the chest demonstrates the lungs to be symmetrically aerated without evidence of mas
s, infiltrate or effusion.  The cardiomediastinal contours are unremarkable.  Osseous structures are 
intact.

 

CONCLUSION:     

No acute cardiopulmonary process. No change from prior.

 

 

 

 Stuart Dickerson MD on March 16, 2018 at 3:56           

Board Certified Radiologist.

 This report was verified electronically.

## 2018-03-16 NOTE — HHI.DS
Discharge Summary


Admission Date


Mar 15, 2018 at 08:32


Discharge Date:  Mar 16, 2018


Admitting Diagnosis





severe angioedema





(1) Angioedema


ICD Code:  T78.3XXA - Angioneurotic edema, initial encounter


Diagnosis:  Principal


Status:  Acute


(2) Vertigo


ICD Code:  R42 - Dizziness and giddiness


Status:  Chronic


(3) Middle cerebral artery aneurysm


ICD Code:  I67.1 - Cerebral aneurysm, nonruptured


Status:  Chronic


Brief History





67 Year-old male who developed nausea vomiting with dizziness last evening and 

took meclizine.  This morning around 2:30 AM he noticed tongue swelling with 

difficulty speech hence was brought to the ER by his wife.  She did given 

Benadryl tablets at home.  On evaluation in the ER at [facility] he was noted 

to have significant tongue swelling and was diagnosed to have angioedema.  He 

received IV Solu-Medrol, Benadryl as well as famotidine and epinephrine.  He 

was evaluated by anesthesiologist at [facility] who felt that patient's tongue 

swelling was going down and he was able to speak significantly better and was 

seen for transport to the main hospital without intubation.  Patient was 

transferred to [facility] where I evaluated him following his arrival.  At that time 

he was laying in the bed not in any acute distress.  He did have some tongue 

swelling at the time with some submental edema however was able to speak 

significantly better and felt that his tongue swelling was going down since his 

arrival to the ER earlier.  He does take amlodipine/benazepril for hypertension 

for years and the benazepril may be what caused angioedema.  He also stated 

that he has been having problems with vertigo with nausea vomiting and 

dizziness since 4 months.  He has had chronic problem with fluid in the right 

ear for which he previously has been evaluated by specialists.  He has not been 

seen for his vertigo in the last 4 months by any ENT physician and has just 

been using meclizine off and on as needed.  Patient denies any fevers, chills.  

He recently was treated for respiratory tract infection with Z-Roderick and Medrol 

Dosepak which she finished a few days ago.  He is allergic to penicillin which 

causes tongue swelling however denies having received any penicillin-containing 

antibiotic.


Patient has past medical history significant for hypertension, ulcerated 

esophagus, hernia repair, stents on lower legs,





 History 


PFSH


Past Medical History


Hx Anticoagulant Therapy:  Yes


Cancer:  No


Cardiovascular Problems:  Yes (htn on meds)


Chest Pain:  No


Diabetes:  No


Diminished Hearing:  Yes


Endocrine:  No


Gastrointestinal Disorders:  Yes (ULCERATED ESOPHAGUS)


Glaucoma:  No


Genitourinary:  No


Hepatitis:  No


Hiatal Hernia:  No


Hypertension:  Yes


Immune Disorder:  No


Musculoskeletal:  No


Neurologic:  No


Psychiatric:  No


Reproductive:  No


Respiratory:  No


Integumentary:  No


Immunizations Current:  No


Thyroid Disease:  No





Past Surgical History


Abdominal Surgery:  No


AICD:  No


Cardiac Surgery:  No


Ear Surgery:  No


Endocrine Surgery:  No


Eye Surgery:  No


Genitourinary Surgery:  No


Gynecologic Surgery:  No


Joint Replacement:  No


Oral Surgery:  No


Pacemaker:  No


Thoracic Surgery:  No


Other Surgery:  Yes (HERNIA REPAIR STENTS LOWER LEGS)





Social History


Alcohol Use:  Yes (occasional alcohol use, )


Tobacco Use:  Yes (occasional cigars)


Substance Use:  No





 Allergies-Medications 


Allergies-Medications


(Allergen,Severity, Reaction):  


Coded Allergies:  


     penicillin G (Verified  Allergy, Severe, Anaphylaxis, 3/15/18)


Reported Meds & Prescriptions





Reported Meds & Active Scripts


Active


Reported


Meclizine (Meclizine HCl) 12.5 Mg Tab 12.5 Mg PO TID PRN


Aspirin 81 Mg Chew 81 Mg PO DAILY


Microzide (Hydrochlorothiazide) 12.5 Mg Cap 12.5 Mg PO DAILY


Amlodipine-Benazepril 5-20 Mg Cap 1 Cap PO DAILY








 ROS 


Review of Systems


General / Constitutional:  No: Fever


Eyes:  No: Visual changes


HENT:  Positive: Other (Tongue swelling)


Cardiovascular:  No: Chest Pain or Discomfort


Respiratory:  No: Shortness of Breath


Gastrointestinal:  No: Abdominal Pain


Genitourinary:  No: Dysuria


Musculoskeletal:  No: Pain


Skin:  No Rash


Neurologic:  No: Weakness


Psychiatric:  No: Depression


Endocrine:  No: Polydipsia


Hematologic/Lymphatic:  No: Easy Bruising


CBC/BMP:  


3/16/18 0345                                                                   

             3/16/18 0345





Significant Findings





Laboratory Tests








Test


  3/15/18


11:51 3/15/18


14:08 3/16/18


03:45


 


Random Glucose


  


  133 MG/DL


() 169 MG/DL


()


 


Albumin


  


  2.7 GM/DL


(3.4-5.0) 2.5 GM/DL


(3.4-5.0)


 


Calcium Level


  


  7.8 MG/DL


(8.5-10.1) 8.0 MG/DL


(8.5-10.1)


 


Aspartate Amino Transf


(AST/SGOT) 


  9 U/L (15-37) 


  11 U/L (15-37) 


 


 


Potassium Level


  


  5.6 MEQ/L


(3.5-5.1) 


 


 


Chloride Level


  


  109 MEQ/L


() 108 MEQ/L


()


 


Mean Platelet Volume


  


  


  6.9 FL


(7.0-11.0)


 


Neutrophils (%) (Auto)


  


  


  91.3 %


(16.0-70.0)


 


Lymphocytes (%) (Auto)


  


  


  5.0 %


(9.0-44.0)


 


Neutrophils # (Auto)


  


  


  8.7 TH/MM3


(1.8-7.7)


 


Lymphocytes # (Auto)


  


  


  0.5 TH/MM3


(1.0-4.8)


 


Total Protein


  


  


  6.0 GM/DL


(6.4-8.2)


 


Estimat Glomerular Filtration


Rate 


  


  80 ML/MIN


(>89)








Imaging





CT Head: Possible aneurysm distal right middle cerebral artery. (Patient states 

he was evaluated for the by a Neurosurgeon in [place], [place] and told it was 

benign; nothing to worry about.)


PE at Discharge


Alert, cooperative.


Tongue edema completely resolved. Airway widely patent.


Normotensive 120s/60s.


Transfer Summary





Acute angioedema most probably related to benazepril contained in one of his 

combination BP meds. Listed now as allergy. I have written Norvasc as a 

separate proscription alone.





He has an appointment this afternoon with an ENT physician for followup of his 

vertigo/right ear fluid problem.





I will discuss his CT head findings with one of are local neurosurgeons and let 

the patient know if he feels differently about how to treat the RCA aneurysm.


Hospital Course


Angioedema resolved.


Pt Condition on Discharge:  Good


Discharge Disposition:  Discharge Home


Discharge Instructions


DIET: Follow Instructions for:  As Tolerated, No Restrictions


Additional Information


Family physician: Keenan Wilkins MD. 





Please send a copy of discharge summary.











Danilo Anthony MD Mar 16, 2018 10:09

## 2023-03-08 ENCOUNTER — APPOINTMENT (RX ONLY)
Dept: URBAN - METROPOLITAN AREA CLINIC 52 | Facility: CLINIC | Age: 73
Setting detail: DERMATOLOGY
End: 2023-03-08

## 2023-03-08 DIAGNOSIS — L82.0 INFLAMED SEBORRHEIC KERATOSIS: ICD-10-CM

## 2023-03-08 DIAGNOSIS — L60.3 NAIL DYSTROPHY: ICD-10-CM

## 2023-03-08 DIAGNOSIS — L82.1 OTHER SEBORRHEIC KERATOSIS: ICD-10-CM

## 2023-03-08 PROCEDURE — ? FULL BODY SKIN EXAM - DECLINED

## 2023-03-08 PROCEDURE — 17110 DESTRUCTION B9 LES UP TO 14: CPT

## 2023-03-08 PROCEDURE — ? LIQUID NITROGEN

## 2023-03-08 PROCEDURE — 99203 OFFICE O/P NEW LOW 30 MIN: CPT | Mod: 25

## 2023-03-08 PROCEDURE — ? COUNSELING

## 2023-03-08 PROCEDURE — ? TREATMENT REGIMEN

## 2023-03-08 ASSESSMENT — LOCATION DETAILED DESCRIPTION DERM
LOCATION DETAILED: RIGHT INDEX FINGERNAIL
LOCATION DETAILED: LEFT MEDIAL ZYGOMA
LOCATION DETAILED: RIGHT THUMBNAIL
LOCATION DETAILED: LEFT CLAVICULAR NECK
LOCATION DETAILED: LEFT INDEX FINGERNAIL
LOCATION DETAILED: LEFT THUMBNAIL

## 2023-03-08 ASSESSMENT — LOCATION SIMPLE DESCRIPTION DERM
LOCATION SIMPLE: LEFT INDEX FINGER
LOCATION SIMPLE: RIGHT INDEX FINGER
LOCATION SIMPLE: LEFT ZYGOMA
LOCATION SIMPLE: RIGHT HAND
LOCATION SIMPLE: LEFT ANTERIOR NECK
LOCATION SIMPLE: LEFT THUMB

## 2023-03-08 ASSESSMENT — LOCATION ZONE DERM
LOCATION ZONE: FINGERNAIL
LOCATION ZONE: NECK
LOCATION ZONE: FACE

## 2023-03-08 NOTE — PROCEDURE: LIQUID NITROGEN
Duration Of Freeze Thaw-Cycle (Seconds): 5
Include Z78.9 (Other Specified Conditions Influencing Health Status) As An Associated Diagnosis?: No
Consent: The patient's consent was obtained including but not limited to risks of crusting, scabbing, blistering, scarring, darker or lighter pigmentary change, recurrence, incomplete removal and infection.
Detail Level: Detailed
Show Aperture Variable?: Yes
Medical Necessity Information: It is in your best interest to select a reason for this procedure from the list below. All of these items fulfill various CMS LCD requirements except the new and changing color options.
Spray Paint Text: The liquid nitrogen was applied to the skin utilizing a spray paint frosting technique.
Number Of Freeze-Thaw Cycles: 3 freeze-thaw cycles
Medical Necessity Clause: This procedure was medically necessary because the lesions that were treated were:
Post-Care Instructions: I reviewed with the patient in detail post-care instructions. Patient is to wear sunprotection, and avoid picking at any of the treated lesions. Pt may apply Vaseline to crusted or scabbing areas.

## 2024-06-11 ENCOUNTER — APPOINTMENT (RX ONLY)
Dept: URBAN - METROPOLITAN AREA CLINIC 52 | Facility: CLINIC | Age: 74
Setting detail: DERMATOLOGY
End: 2024-06-11

## 2024-06-11 DIAGNOSIS — L82.0 INFLAMED SEBORRHEIC KERATOSIS: ICD-10-CM

## 2024-06-11 PROCEDURE — ? FULL BODY SKIN EXAM - DECLINED

## 2024-06-11 PROCEDURE — ? EDUCATIONAL RESOURCES PROVIDED

## 2024-06-11 PROCEDURE — ? LIQUID NITROGEN

## 2024-06-11 PROCEDURE — 17110 DESTRUCTION B9 LES UP TO 14: CPT

## 2024-06-11 PROCEDURE — ? COUNSELING

## 2024-06-11 ASSESSMENT — LOCATION DETAILED DESCRIPTION DERM: LOCATION DETAILED: LEFT CLAVICULAR NECK

## 2024-06-11 ASSESSMENT — LOCATION SIMPLE DESCRIPTION DERM: LOCATION SIMPLE: LEFT ANTERIOR NECK

## 2024-06-11 ASSESSMENT — LOCATION ZONE DERM: LOCATION ZONE: NECK

## 2024-06-11 NOTE — PROCEDURE: LIQUID NITROGEN
Consent: The patient's consent was obtained including but not limited to risks of crusting, scabbing, blistering, scarring, darker or lighter pigmentary change, recurrence, incomplete removal and infection.
Duration Of Freeze Thaw-Cycle (Seconds): 5
Show Applicator Variable?: Yes
Spray Paint Text: The liquid nitrogen was applied to the skin utilizing a spray paint frosting technique.
Render Note In Bullet Format When Appropriate: No
Detail Level: Detailed
Medical Necessity Clause: This procedure was medically necessary because the lesions that were treated were:
Number Of Freeze-Thaw Cycles: 3 freeze-thaw cycles
Medical Necessity Information: It is in your best interest to select a reason for this procedure from the list below. All of these items fulfill various CMS LCD requirements except the new and changing color options.
Post-Care Instructions: I reviewed with the patient in detail post-care instructions. Patient is to wear sunprotection, and avoid picking at any of the treated lesions. Pt may apply Vaseline to crusted or scabbing areas.